# Patient Record
Sex: MALE | Race: WHITE
[De-identification: names, ages, dates, MRNs, and addresses within clinical notes are randomized per-mention and may not be internally consistent; named-entity substitution may affect disease eponyms.]

---

## 2018-02-09 ENCOUNTER — HOSPITAL ENCOUNTER (EMERGENCY)
Dept: HOSPITAL 74 - JERFT | Age: 33
Discharge: HOME | End: 2018-02-09
Payer: COMMERCIAL

## 2018-02-09 VITALS — BODY MASS INDEX: 33.7 KG/M2

## 2018-02-09 VITALS — SYSTOLIC BLOOD PRESSURE: 128 MMHG | HEART RATE: 62 BPM | DIASTOLIC BLOOD PRESSURE: 70 MMHG | TEMPERATURE: 98.1 F
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
## 2018-02-09 DIAGNOSIS — M54.5: Primary | ICD-10-CM
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
## 2018-02-09 NOTE — PDOC
Rapid Medical Evaluation


Time Seen by Provider: 02/09/18 17:34


Medical Evaluation: 


 Allergies











Allergy/AdvReac Type Severity Reaction Status Date / Time


 


aspirin Allergy   Verified 05/30/15 19:10








I have performed a brief in-person evaluation of this patient. 


The patient presents with a chief complaint of:  low back pain when doing 

exercise x 3 weeks; has taken diclofenac without relief


Pertinent physical exam findings: No midline lumbar spine TTP or step offs.


I have ordered the following: nothing


The patient will proceed to the ED for further evaluation.

## 2018-02-09 NOTE — PDOC
History of Present Illness





- General


Chief Complaint: Back Pain


Stated Complaint: BACK PAIN


Time Seen by Provider: 02/09/18 17:34


History Source: Patient


Exam Limitations: No Limitations





- History of Present Illness


Initial Comments: 





02/09/18 19:01


32 yr male with history of back injury 3 weeks ago. pt states he was weight 

lifting and then was on the floor bending down fixing them and he has strained 

his low back. Pt has been in [place] and [place] seeing a chiropractor and 

having shots . pt continues to have pain. no urine or bowel dysfunction. 





Past History





- Past Medical History


Allergies/Adverse Reactions: 


 Allergies











Allergy/AdvReac Type Severity Reaction Status Date / Time


 


aspirin Allergy   Verified 02/09/18 17:35











Home Medications: 


Ambulatory Orders





Cyclobenzaprine HCl [Flexeril -] 10 mg PO TID PRN #21 tablet 02/09/18 


Diclofenac Sodium [Voltaren -] 75 mg PO ONCE 02/09/18 


Methylprednisolone [Medrol Dose Oli] 4 mg PO ASDIR #21 tablet 02/09/18 








COPD: No





- Surgical History


Abdominal Surgery: Yes ([email or web address])





- Immunization History


Immunization Up to Date: Yes





- Suicide/Smoking/Psychosocial Hx


Smoking History: Never smoked


Have you smoked in the past 12 months: No


Information on smoking cessation initiated: No


Hx Alcohol Use: No


Drug/Substance Use Hx: No


Substance Use Type: None





*Physical Exam





- Vital Signs


 Last Vital Signs











Temp Pulse Resp BP Pulse Ox


 


 98.1 F   62   18   128/70   100 


 


 02/09/18 17:35  02/09/18 17:35  02/09/18 17:35  02/09/18 17:35  02/09/18 17:35














- Physical Exam


General Appearance: Yes: Nourished, Appropriately Dressed


HEENT: positive: EOMI, ELIAS, Normal ENT Inspection, TMs Normal, Pharynx Normal.

  negative: Other


Neck: positive: Supple.  negative: Tender


Respiratory/Chest: positive: Lungs Clear, Normal Breath Sounds.  negative: 

Chest Tender


Cardiovascular: positive: Regular Rhythm, Regular Rate


Musculoskeletal: positive: Normal Inspection, Muscle Spasm, Other (pos slr left 

side ).  negative: CVA Tenderness, CVA Tenderness (R), CVA Tenderness (L), 

Vertebral Tenderness


Extremity: positive: Normal Capillary Refill, Normal Inspection, Normal Range 

of Motion





ED Treatment Course





- RADIOLOGY


Radiology Studies Ordered: 














 Category Date Time Status


 


 SPINE-LUMBAR ONLY [RAD] Stat Radiology  02/09/18 18:27 Taken














Medical Decision Making





- Medical Decision Making





02/09/18 19:16


cc: lower back pain neg uriane or bowel dysfunction


neg saddle anesthesia 


will give motrin now


pt refused toradol


will dc home with flexeril for muscle spasm 


take the medrol dose pack start tomorrow (steroid) 





*DC/Admit/Observation/Transfer


Diagnosis at time of Disposition: 


Low back pain


Qualifiers:


 Chronicity: acute Back pain laterality: bilateral Sciatica presence: without 

sciatica Qualified Code(s): M54.5 - Low back pain








- Discharge Dispostion


Disposition: HOME


Condition at time of disposition: Good





- Prescriptions


Prescriptions: 


Cyclobenzaprine HCl [Flexeril -] 10 mg PO TID PRN #21 tablet


 PRN Reason: Muscle Spasms


Methylprednisolone [Medrol Dose Oli] 4 mg PO ASDIR #21 tablet





- Referrals


Referrals: 


Cecilia Enamorado [Primary Care Provider] - 


Jose Kendrick MD [Staff Physician] - 





- Patient Instructions


Additional Instructions: 


apply warm compresses to the lower back every 2hrs for 20 minutes 


also use over the counter Icy Hot rubbing cream to the lower back 


take the medrol dose pack as directed 


take flexeril for any muscle spasm do not drink alcohol no driving, this may 

make you sleepy 





- Post Discharge Activity